# Patient Record
Sex: FEMALE | Race: WHITE | Employment: FULL TIME | URBAN - METROPOLITAN AREA
[De-identification: names, ages, dates, MRNs, and addresses within clinical notes are randomized per-mention and may not be internally consistent; named-entity substitution may affect disease eponyms.]

---

## 2022-07-05 ENCOUNTER — TRANSFERRED RECORDS (OUTPATIENT)
Dept: HEALTH INFORMATION MANAGEMENT | Facility: CLINIC | Age: 37
End: 2022-07-05

## 2022-07-07 ENCOUNTER — OFFICE VISIT (OUTPATIENT)
Dept: SURGERY | Facility: CLINIC | Age: 37
End: 2022-07-07
Payer: COMMERCIAL

## 2022-07-07 ENCOUNTER — TELEPHONE (OUTPATIENT)
Dept: SURGERY | Facility: CLINIC | Age: 37
End: 2022-07-07

## 2022-07-07 VITALS
HEART RATE: 90 BPM | DIASTOLIC BLOOD PRESSURE: 74 MMHG | BODY MASS INDEX: 21.39 KG/M2 | SYSTOLIC BLOOD PRESSURE: 108 MMHG | OXYGEN SATURATION: 97 % | WEIGHT: 133.1 LBS | HEIGHT: 66 IN

## 2022-07-07 DIAGNOSIS — K80.20 SYMPTOMATIC CHOLELITHIASIS: Primary | ICD-10-CM

## 2022-07-07 PROCEDURE — 99203 OFFICE O/P NEW LOW 30 MIN: CPT | Performed by: SURGERY

## 2022-07-07 RX ORDER — ESTRADIOL AND NORETHINDRONE ACETATE 1; .5 MG/1; MG/1
1 TABLET ORAL EVERY MORNING
COMMUNITY

## 2022-07-07 RX ORDER — ONDANSETRON 4 MG/1
TABLET, FILM COATED ORAL EVERY 8 HOURS PRN
COMMUNITY

## 2022-07-07 RX ORDER — IBUPROFEN 400 MG/1
400 TABLET, FILM COATED ORAL EVERY 6 HOURS PRN
COMMUNITY

## 2022-07-07 RX ORDER — CLONAZEPAM 2 MG/1
0.5 TABLET ORAL 2 TIMES DAILY PRN
COMMUNITY

## 2022-07-07 RX ORDER — ACETAMINOPHEN 325 MG/1
325-650 TABLET ORAL EVERY 6 HOURS PRN
COMMUNITY

## 2022-07-07 ASSESSMENT — PAIN SCALES - GENERAL: PAINLEVEL: MILD PAIN (3)

## 2022-07-07 NOTE — NURSING NOTE
"Chief Complaint   Patient presents with     New Patient     Gallbladder issues       Vitals:    07/07/22 1212   BP: 108/74   BP Location: Left arm   Patient Position: Sitting   Cuff Size: Adult Regular   Pulse: 90   SpO2: 97%   Weight: 60.4 kg (133 lb 1.6 oz)   Height: 1.676 m (5' 6\")       Body mass index is 21.48 kg/m .                          Keren Hough, EMT    "

## 2022-07-07 NOTE — TELEPHONE ENCOUNTER
Patient called to schedule laparoscopic cholecystectomy with robotic assist, seen today in clinic with Dr. Haroldo Rust. Scheduled 7/14 at 12:25 p.m. at the [facility], to arrive at 11 a.m., 5th floor.  PAC scheduled for in-person visit tomorrow with Lucy Pyle.   Discussed patient will need to do an in-home COVID-19 test on 7/14 and bring the result with her when she comes for surgery.

## 2022-07-07 NOTE — PATIENT INSTRUCTIONS
You saw Dr Rust and were offered laparoscopic cholecystectomy with robot-assist. To schedule surgery, please call Jovanny Rincon at [phone]. She will help arrange a surgery date, as well as a pre-anesthesia clinic visit and a COVID test.

## 2022-07-07 NOTE — PROGRESS NOTES
New General Surgery Consultation Note        Liseth Gamez  [id number]  1985 July 7, 2022     Requesting Provider: Referred Self     Dear Dr Kay primary care provider on file.,     I had the pleasure of seeing your patient, Liseth Gamez is a 37 year old female who presents to clinic today for the following health issues       CHIEF COMPLAINT:  Epigastric pain    Assessment & Plan   Problem List Items Addressed This Visit    None     Visit Diagnoses     Symptomatic cholelithiasis    -  Primary    Relevant Orders    PAC Visit Referral (For [facility] Only)    Asymptomatic COVID-19 Virus (Coronavirus) by PCR    Case Request: CHOLECYSTECTOMY, LAPAROSCOPIC WITH ROBOTIC-ASSIST (Completed)       Patient will call Jovanny to schedule    30 minutes spent on the date of the encounter doing chart review, history and exam, documentation and further activities per the note      HISTORY OF PRESENT ILLNESS:  I had the pleasure of seeing Ms Gamez today. She is a 37F who rpesents ith worsening epigastric pain. The pin is now becoming more frequent and intense lasting ~2 hours and is qite painful. It is now associated with food such as corn dogs. She notes having cholelithiasis on ultrasound as well. No reflux.     ROS    PAST MEDICAL HISTORY:  No past medical history on file.     PAST SURGICAL HISTORY:  Deviated septum repair    MEDICATIONS:  Current Outpatient Medications   Medication     acetaminophen (TYLENOL) 325 MG tablet     clonazePAM (KLONOPIN) 2 MG tablet     estradiol-norethindrone (ACTIVELLA) 1-0.5 MG tablet     ibuprofen (ADVIL/MOTRIN) 400 MG tablet     ondansetron (ZOFRAN) 4 MG tablet     No current facility-administered medications for this visit.        ALLERGIES:  No Known Allergies     SOCIAL HISTORY:  Social History     Socioeconomic History     Marital status: Single     Spouse name: None     Number of children: None     Years of education: None     Highest education level: None   Tobacco Use     Smoking  "status: Never Smoker     Smokeless tobacco: Never Used           PHYSICAL EXAM:  Objective    /74 (BP Location: Left arm, Patient Position: Sitting, Cuff Size: Adult Regular)   Pulse 90   Ht 1.676 m (5' 6\")   Wt 60.4 kg (133 lb 1.6 oz)   SpO2 97%   BMI 21.48 kg/m    /74 (BP Location: Left arm, Patient Position: Sitting, Cuff Size: Adult Regular)   Pulse 90   Ht 1.676 m (5' 6\")   Wt 60.4 kg (133 lb 1.6 oz)   SpO2 97%   BMI 21.48 kg/m    Body mass index is 21.48 kg/m .  Physical Exam   aaox3  abd s/nt/nd       DISCUSSION OF RISKS:  I reviewed the risks of surgery with Liseth Gamez.    These include, but are not limited to, death, myocardial infarction, pneumonia, urinary tract infection, deep venous thrombosis with or without pulmonary embolus, abdominal infection from bowel injury or abscess, bowel obstruction, wound infection, and bleeding.    More specific risks related to laparoscopic cholecystectomy include bile duct injury (3/1000), bile leak (10/1000), retained common bile duct stone (10/1000), postcholecystectomy diarrhea (1-2%) and these complications may require additional treatment. Also discussed having to open or abort.      Sincerely,     Haroldo Rust MD  "

## 2022-07-07 NOTE — LETTER
Date:July 7, 2022      Provider requested that no letter be sent. Do not send.       [facility]

## 2022-07-07 NOTE — LETTER
7/7/2022       RE: Liseth Gamez  [address]  [place] [address]     Dear Colleague,    Thank you for referring your patient, Liseth Gamez, to the [facility] [place] [facility]. Please see a copy of my visit note below.    New General Surgery Consultation Note        Liseth Gamez  [id number]  1985 July 7, 2022     Requesting Provider: Referred Self     Dear Dr Kay primary care provider on file.,     I had the pleasure of seeing your patient, Liseth Gamez is a 37 year old female who presents to clinic today for the following health issues       CHIEF COMPLAINT:  Epigastric pain    Assessment & Plan   Problem List Items Addressed This Visit    None     Visit Diagnoses     Symptomatic cholelithiasis    -  Primary    Relevant Orders    PAC Visit Referral (For [facility] Only)    Asymptomatic COVID-19 Virus (Coronavirus) by PCR    Case Request: CHOLECYSTECTOMY, LAPAROSCOPIC WITH ROBOTIC-ASSIST (Completed)       Patient will call Jovanny to schedule    30 minutes spent on the date of the encounter doing chart review, history and exam, documentation and further activities per the note      HISTORY OF PRESENT ILLNESS:  I had the pleasure of seeing Ms Gamez today. She is a 37F who rpesents ith worsening epigastric pain. The pin is now becoming more frequent and intense lasting ~2 hours and is qite painful. It is now associated with food such as corn dogs. She notes having cholelithiasis on ultrasound as well. No reflux.     ROS    PAST MEDICAL HISTORY:  No past medical history on file.     PAST SURGICAL HISTORY:  Deviated septum repair    MEDICATIONS:  Current Outpatient Medications   Medication     acetaminophen (TYLENOL) 325 MG tablet     clonazePAM (KLONOPIN) 2 MG tablet     estradiol-norethindrone (ACTIVELLA) 1-0.5 MG tablet     ibuprofen (ADVIL/MOTRIN) 400 MG tablet     ondansetron (ZOFRAN) 4 MG tablet     No current  "facility-administered medications for this visit.        ALLERGIES:  No Known Allergies     SOCIAL HISTORY:  Social History     Socioeconomic History     Marital status: Single     Spouse name: None     Number of children: None     Years of education: None     Highest education level: None   Tobacco Use     Smoking status: Never Smoker     Smokeless tobacco: Never Used           PHYSICAL EXAM:  Objective    /74 (BP Location: Left arm, Patient Position: Sitting, Cuff Size: Adult Regular)   Pulse 90   Ht 1.676 m (5' 6\")   Wt 60.4 kg (133 lb 1.6 oz)   SpO2 97%   BMI 21.48 kg/m    /74 (BP Location: Left arm, Patient Position: Sitting, Cuff Size: Adult Regular)   Pulse 90   Ht 1.676 m (5' 6\")   Wt 60.4 kg (133 lb 1.6 oz)   SpO2 97%   BMI 21.48 kg/m    Body mass index is 21.48 kg/m .  Physical Exam   aaox3  abd s/nt/nd       DISCUSSION OF RISKS:  I reviewed the risks of surgery with Liseth Gamez.    These include, but are not limited to, death, myocardial infarction, pneumonia, urinary tract infection, deep venous thrombosis with or without pulmonary embolus, abdominal infection from bowel injury or abscess, bowel obstruction, wound infection, and bleeding.    More specific risks related to laparoscopic cholecystectomy include bile duct injury (3/1000), bile leak (10/1000), retained common bile duct stone (10/1000), postcholecystectomy diarrhea (1-2%) and these complications may require additional treatment. Also discussed having to open or abort.      Sincerely,     Haroldo Rust MD      Again, thank you for allowing me to participate in the care of your patient.      Sincerely,    Haroldo Rust MD      "

## 2022-07-08 ENCOUNTER — OFFICE VISIT (OUTPATIENT)
Dept: SURGERY | Facility: CLINIC | Age: 37
End: 2022-07-08
Payer: COMMERCIAL

## 2022-07-08 ENCOUNTER — ANESTHESIA EVENT (OUTPATIENT)
Dept: SURGERY | Facility: AMBULATORY SURGERY CENTER | Age: 37
End: 2022-07-08
Payer: COMMERCIAL

## 2022-07-08 ENCOUNTER — PRE VISIT (OUTPATIENT)
Dept: SURGERY | Facility: CLINIC | Age: 37
End: 2022-07-08

## 2022-07-08 VITALS
SYSTOLIC BLOOD PRESSURE: 120 MMHG | TEMPERATURE: 98.2 F | RESPIRATION RATE: 16 BRPM | DIASTOLIC BLOOD PRESSURE: 79 MMHG | HEART RATE: 78 BPM | WEIGHT: 132.1 LBS | OXYGEN SATURATION: 97 % | HEIGHT: 66 IN | BODY MASS INDEX: 21.23 KG/M2

## 2022-07-08 DIAGNOSIS — K80.20 SYMPTOMATIC CHOLELITHIASIS: ICD-10-CM

## 2022-07-08 DIAGNOSIS — Z01.818 PRE-OP EXAMINATION: Primary | ICD-10-CM

## 2022-07-08 PROCEDURE — 99203 OFFICE O/P NEW LOW 30 MIN: CPT | Performed by: PHYSICIAN ASSISTANT

## 2022-07-08 ASSESSMENT — LIFESTYLE VARIABLES: TOBACCO_USE: 0

## 2022-07-08 ASSESSMENT — PAIN SCALES - GENERAL: PAINLEVEL: MILD PAIN (2)

## 2022-07-08 NOTE — TELEPHONE ENCOUNTER
FUTURE VISIT INFORMATION      SURGERY INFORMATION:    Date: 7/14/22    [facility]    Surgeon:  Haroldo Rust MD    Anesthesia Type:  general    Procedure: CHOLECYSTECTOMY, LAPAROSCOPIC WITH ROBOTIC-ASSIST    Consult: ov 7/7/22    RECORDS REQUESTED FROM:        Pertinent Medical History: None

## 2022-07-08 NOTE — H&P
Pre-Operative H & P     CC:  Preoperative exam to assess for increased cardiopulmonary risk while undergoing surgery and anesthesia.    Date of Encounter: 7/8/2022  Primary Care Physician:  No primary care provider on file.     Reason for visit:   Encounter Diagnoses   Name Primary?     Pre-op examination Yes     Symptomatic cholelithiasis        HPI  Liseth Gamez is a 37 year old female who presents for pre-operative H & P in preparation for  Procedure Information     Case: [id number] Date/Time: 07/14/22 1205    Procedure: CHOLECYSTECTOMY, LAPAROSCOPIC WITH ROBOTIC-ASSIST (N/A Abdomen)    Anesthesia type: General    Diagnosis: Symptomatic cholelithiasis [K80.20]    Pre-op diagnosis: Symptomatic cholelithiasis [K80.20]    [facility] [place] [address] [facility]    Providers: Haroldo Rust MD          The patient is a 37-year-old woman with a medical history significant for hashimoto's thyroiditis, recent kidney and bladder infection and anxiety who presented to Dr. Rust's clinic on 7/7/2022 with complaints of worsening epigastric pain.  She had prior ultrasound which showed cholelithiasis.  She was counseled for the procedure as above.    History is obtained from the patient and chart review    Hx of abnormal bleeding or anti-platelet use: none    Menstrual history: Patient's last menstrual period was 06/24/2022 (within days).:      Past Medical History  Past Medical History:   Diagnosis Date     Anxiety      Hashimoto's thyroiditis      Symptomatic cholelithiasis        Past Surgical History  Past Surgical History:   Procedure Laterality Date     EGD       RECONSTRUCTION NASAL VALVE, WITH SUBMUCOSAL RESECTION OF TURBINATES AND SEPTOPLASTY         Prior to Admission Medications  Current Outpatient Medications   Medication Sig Dispense Refill     acetaminophen (TYLENOL) 325 MG tablet Take 325-650 mg by mouth every 6 hours as needed for mild pain       clonazePAM  (KLONOPIN) 2 MG tablet Take 0.5 mg by mouth 2 times daily as needed for anxiety       estradiol-norethindrone (ACTIVELLA) 1-0.5 MG tablet Take 1 tablet by mouth every morning       ibuprofen (ADVIL/MOTRIN) 400 MG tablet Take 400 mg by mouth every 6 hours as needed for moderate pain       ondansetron (ZOFRAN) 4 MG tablet Take by mouth every 8 hours as needed for nausea         Allergies  No Known Allergies    Social History  Social History     Socioeconomic History     Marital status: Single     Spouse name: Not on file     Number of children: Not on file     Years of education: Not on file     Highest education level: Not on file   Occupational History     Not on file   Tobacco Use     Smoking status: Never Smoker     Smokeless tobacco: Never Used   Substance and Sexual Activity     Alcohol use: Not on file     Drug use: Not on file     Sexual activity: Not on file   Other Topics Concern     Not on file   Social History Narrative     Not on file     Social Determinants of Health     Financial Resource Strain: Not on file   Food Insecurity: Not on file   Transportation Needs: Not on file   Physical Activity: Not on file   Stress: Not on file   Social Connections: Not on file   Intimate Partner Violence: Not on file   Housing Stability: Not on file       Family History  Family History   Problem Relation Age of Onset     Anesthesia Reaction No family hx of        Review of Systems  The complete review of systems is negative other than noted in the HPI or here.   Anesthesia Evaluation   Pt has had prior anesthetic. Type: MAC and General.        ROS/MED HX  ENT/Pulmonary: Comment: S/p septoplasty and turbinate reduction   (-) tobacco use   Neurologic:  - neg neurologic ROS     Cardiovascular:       METS/Exercise Tolerance: >4 METS    Hematologic:  - neg hematologic  ROS     Musculoskeletal:  - neg musculoskeletal ROS     GI/Hepatic:     (+) cholecystitis/cholelithiasis,  (-) GERD   Renal/Genitourinary:  - neg Renal ROS    "  Endo:     (+) thyroid problem,  Hashimoto's thyroiditis - not on medications,     Psychiatric/Substance Use:     (+) psychiatric history anxiety     Infectious Disease:  - neg infectious disease ROS     Malignancy:  - neg malignancy ROS     Other:  - neg other ROS          /79 (BP Location: Right arm, Patient Position: Sitting, Cuff Size: Adult Regular)   Pulse 78   Temp 98.2  F (36.8  C) (Oral)   Resp 16   Ht 1.676 m (5' 6\")   Wt 59.9 kg (132 lb 1.6 oz)   LMP 06/24/2022 (Within Days)   SpO2 97%   Breastfeeding No   BMI 21.32 kg/m      Physical Exam   Constitutional: Awake, alert, cooperative, no apparent distress, and appears stated age.  Eyes: Pupils equal, round and reactive to light, extra ocular muscles intact, sclera clear, conjunctiva normal.  HENT: Normocephalic, oral pharynx with moist mucus membranes, good dentition. No goiter appreciated.   Respiratory: Clear to auscultation bilaterally, no crackles or wheezing.  Cardiovascular: Regular rate and rhythm, normal S1 and S2, and no murmur noted.  Carotids +2, no bruits. No edema. Palpable pulses to radial  DP and PT arteries.   GI: Normal bowel sounds, soft, non-distended, non-tender, no masses palpated, no hepatosplenomegaly.  No peritoneal signs  Lymph/Hematologic: No cervical lymphadenopathy and no supraclavicular lymphadenopathy.  Genitourinary:  defer  Skin: Warm and dry.  No rashes at anticipated surgical site.   Musculoskeletal: Full ROM of neck. There is no redness, warmth, or swelling of the joints. Gross motor strength is normal.    Neurologic: Awake, alert, oriented to name, place and time. Cranial nerves II-XII are grossly intact. Gait is normal.   Neuropsychiatric: Calm, cooperative. Normal affect.     Prior Labs/Diagnostic Studies   All labs and imaging personally reviewed    Contains abnormal data COMP METABOLIC PANEL  Specimen:  Blood - Blood specimen (specimen)   Ref Range & Units 8 d ago Comments   SODIUM 135 - 145 mmol/L " 139     POTASSIUM 3.5 - 5.0 mmol/L 3.8     CHLORIDE 98 - 110 mmol/L 105     CO2,TOTAL 21 - 31 mmol/L 25     ANION GAP 5 - 18 9     GLUCOSE 65 - 100 mg/dL 89     CALCIUM 8.5 - 10.5 mg/dL 9.3     BUN 8 - 25 mg/dL 12     CREATININE 0.57 - 1.11 mg/dL 0.81     BUN/CREAT RATIO           10 - 20 15     ALBUMIN 3.5 - 5.2 g/dL 4.4     PROTEIN,TOTAL 6.0 - 8.0 g/dL 7.5     GLOBULIN                  2.0 - 3.7 g/dL 3.1     A/G RATIO 1.0 - 2.0 1.4     BILIRUBIN,TOTAL 0.2 - 1.2 mg/dL 0.3     ALK PHOSPHATASE 50 - 136 IU/L 44 Low      ALT (SGPT) 8 - 45 IU/L 50 High      AST (SGOT) 2 - 40 IU/L 22     eGFR >90 mL/min/1.73m2 >90  As of 03/15/2022, eGFR is calculated by the CKD-EPI creatinine equation without race adjustment.  eGFR can be influenced by muscle mass, exercise, and diet.  The reported eGFR is an estimation only and is only applicable if the renal function is stable.   Resulting Agency  [facility] LABORATORY    Specimen Collected: 06/30/22 11:45 AM Last Resulted: 06/30/22 12:25 PM   CBC WITH AUTO DIFFERENTIAL  Specimen:  Blood - Blood specimen (specimen)   Ref Range & Units 8 d ago   WHITE BLOOD COUNT         4.5 - 11.0 thou/cu mm 6.2    RED BLOOD COUNT           4.00 - 5.20 mil/cu mm 4.20    HEMOGLOBIN                12.0 - 16.0 g/dL 12.4    HEMATOCRIT                33.0 - 51.0 % 38.6    MCV                       80 - 100 fL 92    MCH                       26.0 - 34.0 pg 29.5    MCHC                      32.0 - 36.0 g/dL 32.1    RDW                       11.5 - 15.5 % 13.3    PLATELET COUNT            140 - 440 thou/cu mm 293    MPV                       6.5 - 11.0 fL 10.0    NEUTROPHILS               % 60.1    LYMPHOCYTES               % 32.0    MONOCYTES                 % 5.0    EOSINOPHILS               % 1.8    BASOPHILS                 % 0.8    IMMATURE GRANULOCYTES(METAS,MYELOS,PROS) % 0.3    ABSOLUTE NEUTROPHILS      1.7 - 7.0 thou/cu mm 3.7    ABSOLUTE LYMPHOCYTES      0.9 - 2.9 thou/cu mm 2.0    ABSOLUTE  MONOCYTES        <0.9 thou/cu mm 0.3    ABSOLUTE EOSINOPHILS      <0.5 thou/cu mm 0.1    ABSOLUTE BASOPHILS        <0.3 thou/cu mm 0.1    ABSOLUTE IMMATURE GRANULOCYTES(METAS,MYELOS,PROS) <0.3 thou/cu mm 0.0    Resulting Agency  [facility] LABORATORY   Specimen Collected: 06/30/22 11:45 AM Last Resulted: 06/30/22 12:02 PM         EKG/ stress test - if available please see in ROS above       The patient's records and results personally reviewed by this provider.     Outside records reviewed from: Care Everywhere          Assessment      Liseth Gamez is a 37 year old female seen as a PAC referral for risk assessment and optimization for anesthesia.    Plan/Recommendations  Pt will be optimized for the proposed procedure.  See below for details on the assessment, risk, and preoperative recommendations    NEUROLOGY  - No history of TIA, CVA or seizure  -Post Op delirium risk factors:  No risk identified    ENT  - No current airway concerns.  Will need to be reassessed day of surgery.  Mallampati: I  TM: > 3    CARDIAC  - No history of CAD, Hypertension and Afib  - METS (Metabolic Equivalents)  Patient performs 4 or more METS exercise without symptoms            Total Score: 0      RCRI-Very low risk: Class 1 0.4% complication rate            Total Score: 0        PULMONARY  - Obstructive Sleep Apnea  No current risk of obstructive sleep apnea   KAYA Low Risk            Total Score: 0      - Denies asthma or inhaler use  - Tobacco History      History   Smoking Status     Never Smoker   Smokeless Tobacco     Never Used       GI  ~ Symptomatic cholecystitis - her symptoms are epigastric in nature but no GERD symptoms. She reports she had recent EGD which was normal.   ~ the patient does report RLQ pain. No peritoneal signs for appendicitis. She reports she had pelvic US and no ovarian cysts seen on images. She does report the pain is related to using abdominal muscles from laying to sitting  PONV High Risk  Total  "Score: 3           1 AN PONV: Pt is Female    1 AN PONV: Patient is not a current smoker    1 AN PONV: Intended Post Op Opioids        /RENAL  - On birth control  ~ The patient was recently treated for UTI and completed antibiotics 5 days ago.     ENDOCRINE    - BMI: Estimated body mass index is 21.32 kg/m  as calculated from the following:    Height as of this encounter: 1.676 m (5' 6\").    Weight as of this encounter: 59.9 kg (132 lb 1.6 oz).  Healthy Weight (BMI 18.5-24.9)  - No history of Diabetes Mellitus    HEME  VTE Low Risk 0.26%            Total Score: 0      - No history of abnormal bleeding or antiplatelet use.      PSYCH  - anxiety - continue PRN clonazepam       The patient is optimized for their procedure. AVS with information on surgery time/arrival time, meds and NPO status given by nursing staff. No further diagnostic testing indicated.      On the day of service:     Prep time: 13 minutes  Visit time: 17 minutes  Documentation time: 7 minutes  ------------------------------------------  Total time: 37 minutes      Lucy Pyle PA-C  Preoperative Assessment Center  [facility]  Clinic and Surgery Center  Phone: [phone]  Fax: [phone]  "

## 2022-07-08 NOTE — PATIENT INSTRUCTIONS
Preparing for Your Surgery      Name:  Liseth Gamez   MRN:  [id number]   :  1985   Today's Date:  2022         Arriving for surgery:  Surgery date:  2022  Arrival time:  11:00 am    Restrictions due to COVID 19:    Effective 2022  1 visitor may accompany patient and wait in the surgery waiting room  All visitors must wear a mask and social distance      parking is available for anyone with mobility limitations or disabilities. (Monday- Friday 7 am- 5 pm)    Please come to:    [facility]  [address] [place] [id number]    Please check in on the 5th floor at the Ambulatory Surgery Center       What can I eat or drink?    -  You may eat and drink normally until 8 hours before surgery. (Until 4 am)  -  You may have clear liquids up to 4 hours before surgery. (Until 8 am)  Examples of clear liquids:  Water  Clear broth  Juices (apple, white grape, white cranberry  and cider) without pulp  Noncarbonated, powder based beverages  (lemonade and Jose L-Aid)  Sodas (Sprite, 7-Up, ginger ale and seltzer)  Coffee or tea (without milk or cream)  Gatorade    --No alcohol for at least 24 hours before surgery    Which medicines can I take?    Hold Aspirin for 7 days before surgery.   Hold Multivitamins for 7 days before surgery.  Hold Supplements for 7 days before surgery.  Hold Ibuprofen (Advil, Motrin) for 1 day before surgery--unless otherwise directed by surgeon.  Hold Naproxen (Aleve) for 4 days before surgery.          -  PLEASE TAKE the following medications the day of surgery   Tylenol if needed  Clonazepam if needed  Estradiol (Activelle)  Ondansetron if needed       How do I prepare myself?  - Please take 2 showers before surgery using Scrubcare or Hibiclens soap.    Use this soap only from the neck to your toes.     Leave the soap on your skin for one minute--then rinse thoroughly.      You may use your own shampoo and conditioner; no other hair products.    - Please remove all jewelry and body piercings.  - No lotions, deodorants or fragrance.  - No makeup or fingernail polish.   - Bring your ID and insurance card.    -If you have a Deep Brain Stimulator, a Spinal Cord Stimulator or any implanted Neuro Device you must bring the remote to the Surgery Center         ALL PATIENTS ARE REQUIRED TO HAVE A RESPONSIBLE ADULT TO DRIVE AND BE IN ATTENDANCE WITH THEM FOR 24 HOURS FOLLOWING SURGERY       Questions or Concerns:    -For questions regarding the day of surgery please contact the Ambulatory Surgery Center at [phone].    -If you have health changes between today and your surgery please contact your surgeon.     - For questions after surgery please contact your surgeon's office     For questions after surgery contact your surgeon

## 2022-07-13 RX ORDER — FENTANYL CITRATE 50 UG/ML
25 INJECTION, SOLUTION INTRAMUSCULAR; INTRAVENOUS
Status: CANCELLED | OUTPATIENT
Start: 2022-07-13

## 2022-07-14 ENCOUNTER — HOSPITAL ENCOUNTER (OUTPATIENT)
Facility: AMBULATORY SURGERY CENTER | Age: 37
Discharge: HOME | End: 2022-07-14
Attending: SURGERY
Payer: COMMERCIAL

## 2022-07-14 ENCOUNTER — ANESTHESIA (OUTPATIENT)
Dept: SURGERY | Facility: AMBULATORY SURGERY CENTER | Age: 37
End: 2022-07-14
Payer: COMMERCIAL

## 2022-07-14 VITALS
BODY MASS INDEX: 21.21 KG/M2 | TEMPERATURE: 98 F | DIASTOLIC BLOOD PRESSURE: 67 MMHG | HEART RATE: 95 BPM | SYSTOLIC BLOOD PRESSURE: 111 MMHG | OXYGEN SATURATION: 96 % | WEIGHT: 132 LBS | HEIGHT: 66 IN | RESPIRATION RATE: 16 BRPM

## 2022-07-14 DIAGNOSIS — K80.20 SYMPTOMATIC CHOLELITHIASIS: ICD-10-CM

## 2022-07-14 LAB
HCG UR QL: NEGATIVE
INTERNAL QC OK POCT: NORMAL
POCT KIT EXPIRATION DATE: NORMAL
POCT KIT LOT NUMBER: NORMAL

## 2022-07-14 PROCEDURE — 47562 LAPAROSCOPIC CHOLECYSTECTOMY: CPT

## 2022-07-14 PROCEDURE — 88304 TISSUE EXAM BY PATHOLOGIST: CPT | Mod: TC | Performed by: SURGERY

## 2022-07-14 PROCEDURE — 47562 LAPAROSCOPIC CHOLECYSTECTOMY: CPT | Performed by: SURGERY

## 2022-07-14 PROCEDURE — 81025 URINE PREGNANCY TEST: CPT | Performed by: PATHOLOGY

## 2022-07-14 PROCEDURE — 88304 TISSUE EXAM BY PATHOLOGIST: CPT | Mod: 26 | Performed by: PATHOLOGY

## 2022-07-14 RX ORDER — LABETALOL HYDROCHLORIDE 5 MG/ML
10 INJECTION, SOLUTION INTRAVENOUS
Status: DISCONTINUED | OUTPATIENT
Start: 2022-07-14 | End: 2022-07-14 | Stop reason: HOSPADM

## 2022-07-14 RX ORDER — OXYCODONE HYDROCHLORIDE 5 MG/1
5 TABLET ORAL EVERY 4 HOURS PRN
Status: DISCONTINUED | OUTPATIENT
Start: 2022-07-14 | End: 2022-07-15 | Stop reason: HOSPADM

## 2022-07-14 RX ORDER — LIOTHYRONINE SODIUM 5 UG/1
TABLET ORAL
COMMUNITY
Start: 2022-07-06

## 2022-07-14 RX ORDER — INDOCYANINE GREEN AND WATER 25 MG
2.5 KIT INJECTION ONCE
Status: COMPLETED | OUTPATIENT
Start: 2022-07-14 | End: 2022-07-14

## 2022-07-14 RX ORDER — LIDOCAINE 40 MG/G
CREAM TOPICAL
Status: DISCONTINUED | OUTPATIENT
Start: 2022-07-14 | End: 2022-07-14 | Stop reason: HOSPADM

## 2022-07-14 RX ORDER — FENTANYL CITRATE 50 UG/ML
25-50 INJECTION, SOLUTION INTRAMUSCULAR; INTRAVENOUS
Status: DISCONTINUED | OUTPATIENT
Start: 2022-07-14 | End: 2022-07-14 | Stop reason: HOSPADM

## 2022-07-14 RX ORDER — KETOROLAC TROMETHAMINE 30 MG/ML
INJECTION, SOLUTION INTRAMUSCULAR; INTRAVENOUS PRN
Status: DISCONTINUED | OUTPATIENT
Start: 2022-07-14 | End: 2022-07-14

## 2022-07-14 RX ORDER — PROPOFOL 10 MG/ML
INJECTION, EMULSION INTRAVENOUS CONTINUOUS PRN
Status: DISCONTINUED | OUTPATIENT
Start: 2022-07-14 | End: 2022-07-14

## 2022-07-14 RX ORDER — BUPIVACAINE HYDROCHLORIDE 2.5 MG/ML
INJECTION, SOLUTION INFILTRATION; PERINEURAL PRN
Status: DISCONTINUED | OUTPATIENT
Start: 2022-07-14 | End: 2022-07-14 | Stop reason: HOSPADM

## 2022-07-14 RX ORDER — CEFAZOLIN SODIUM 2 G/50ML
2 SOLUTION INTRAVENOUS
Status: COMPLETED | OUTPATIENT
Start: 2022-07-14 | End: 2022-07-14

## 2022-07-14 RX ORDER — CEFAZOLIN SODIUM 2 G/50ML
2 SOLUTION INTRAVENOUS SEE ADMIN INSTRUCTIONS
Status: DISCONTINUED | OUTPATIENT
Start: 2022-07-14 | End: 2022-07-14 | Stop reason: HOSPADM

## 2022-07-14 RX ORDER — MEPERIDINE HYDROCHLORIDE 25 MG/ML
12.5 INJECTION INTRAMUSCULAR; INTRAVENOUS; SUBCUTANEOUS
Status: DISCONTINUED | OUTPATIENT
Start: 2022-07-14 | End: 2022-07-15 | Stop reason: HOSPADM

## 2022-07-14 RX ORDER — PROPOFOL 10 MG/ML
INJECTION, EMULSION INTRAVENOUS PRN
Status: DISCONTINUED | OUTPATIENT
Start: 2022-07-14 | End: 2022-07-14

## 2022-07-14 RX ORDER — HYDRALAZINE HYDROCHLORIDE 20 MG/ML
2.5-5 INJECTION INTRAMUSCULAR; INTRAVENOUS EVERY 10 MIN PRN
Status: DISCONTINUED | OUTPATIENT
Start: 2022-07-14 | End: 2022-07-14 | Stop reason: HOSPADM

## 2022-07-14 RX ORDER — ALBUTEROL SULFATE 0.83 MG/ML
2.5 SOLUTION RESPIRATORY (INHALATION) EVERY 4 HOURS PRN
Status: DISCONTINUED | OUTPATIENT
Start: 2022-07-14 | End: 2022-07-14 | Stop reason: HOSPADM

## 2022-07-14 RX ORDER — ONDANSETRON 4 MG/1
4 TABLET, ORALLY DISINTEGRATING ORAL EVERY 8 HOURS PRN
Qty: 8 TABLET | Refills: 0 | Status: SHIPPED | OUTPATIENT
Start: 2022-07-14 | End: 2022-07-14

## 2022-07-14 RX ORDER — NALOXONE HYDROCHLORIDE 0.4 MG/ML
0.2 INJECTION, SOLUTION INTRAMUSCULAR; INTRAVENOUS; SUBCUTANEOUS
Status: DISCONTINUED | OUTPATIENT
Start: 2022-07-14 | End: 2022-07-14 | Stop reason: HOSPADM

## 2022-07-14 RX ORDER — SODIUM CHLORIDE, SODIUM LACTATE, POTASSIUM CHLORIDE, CALCIUM CHLORIDE 600; 310; 30; 20 MG/100ML; MG/100ML; MG/100ML; MG/100ML
INJECTION, SOLUTION INTRAVENOUS CONTINUOUS
Status: DISCONTINUED | OUTPATIENT
Start: 2022-07-14 | End: 2022-07-14 | Stop reason: HOSPADM

## 2022-07-14 RX ORDER — GABAPENTIN 300 MG/1
300 CAPSULE ORAL
Status: COMPLETED | OUTPATIENT
Start: 2022-07-14 | End: 2022-07-14

## 2022-07-14 RX ORDER — SCOLOPAMINE TRANSDERMAL SYSTEM 1 MG/1
1 PATCH, EXTENDED RELEASE TRANSDERMAL
Status: DISCONTINUED | OUTPATIENT
Start: 2022-07-14 | End: 2022-07-15 | Stop reason: HOSPADM

## 2022-07-14 RX ORDER — ONDANSETRON 2 MG/ML
4 INJECTION INTRAMUSCULAR; INTRAVENOUS EVERY 30 MIN PRN
Status: DISCONTINUED | OUTPATIENT
Start: 2022-07-14 | End: 2022-07-15 | Stop reason: HOSPADM

## 2022-07-14 RX ORDER — DEXAMETHASONE SODIUM PHOSPHATE 4 MG/ML
INJECTION, SOLUTION INTRA-ARTICULAR; INTRALESIONAL; INTRAMUSCULAR; INTRAVENOUS; SOFT TISSUE PRN
Status: DISCONTINUED | OUTPATIENT
Start: 2022-07-14 | End: 2022-07-14

## 2022-07-14 RX ORDER — PROPRANOLOL HYDROCHLORIDE 10 MG/1
10 TABLET ORAL
COMMUNITY
Start: 2022-07-13

## 2022-07-14 RX ORDER — LIDOCAINE HYDROCHLORIDE 20 MG/ML
INJECTION, SOLUTION INFILTRATION; PERINEURAL PRN
Status: DISCONTINUED | OUTPATIENT
Start: 2022-07-14 | End: 2022-07-14

## 2022-07-14 RX ORDER — NALOXONE HYDROCHLORIDE 0.4 MG/ML
0.4 INJECTION, SOLUTION INTRAMUSCULAR; INTRAVENOUS; SUBCUTANEOUS
Status: DISCONTINUED | OUTPATIENT
Start: 2022-07-14 | End: 2022-07-14 | Stop reason: HOSPADM

## 2022-07-14 RX ORDER — ACETAMINOPHEN 325 MG/1
975 TABLET ORAL
Status: DISCONTINUED | OUTPATIENT
Start: 2022-07-14 | End: 2022-07-15 | Stop reason: HOSPADM

## 2022-07-14 RX ORDER — OXYCODONE HYDROCHLORIDE 5 MG/1
5-10 TABLET ORAL EVERY 4 HOURS PRN
Qty: 20 TABLET | Refills: 0 | Status: SHIPPED | OUTPATIENT
Start: 2022-07-14

## 2022-07-14 RX ORDER — FLUMAZENIL 0.1 MG/ML
0.2 INJECTION, SOLUTION INTRAVENOUS
Status: DISCONTINUED | OUTPATIENT
Start: 2022-07-14 | End: 2022-07-14 | Stop reason: HOSPADM

## 2022-07-14 RX ORDER — ACETAMINOPHEN 325 MG/1
975 TABLET ORAL ONCE
Status: COMPLETED | OUTPATIENT
Start: 2022-07-14 | End: 2022-07-14

## 2022-07-14 RX ORDER — FENTANYL CITRATE 50 UG/ML
INJECTION, SOLUTION INTRAMUSCULAR; INTRAVENOUS PRN
Status: DISCONTINUED | OUTPATIENT
Start: 2022-07-14 | End: 2022-07-14

## 2022-07-14 RX ORDER — ZOLPIDEM TARTRATE 5 MG/1
5 TABLET ORAL
COMMUNITY
Start: 2022-07-13

## 2022-07-14 RX ORDER — HYDROMORPHONE HYDROCHLORIDE 1 MG/ML
.2-.4 INJECTION, SOLUTION INTRAMUSCULAR; INTRAVENOUS; SUBCUTANEOUS EVERY 5 MIN PRN
Status: DISCONTINUED | OUTPATIENT
Start: 2022-07-14 | End: 2022-07-14 | Stop reason: HOSPADM

## 2022-07-14 RX ORDER — ONDANSETRON 4 MG/1
4 TABLET, ORALLY DISINTEGRATING ORAL EVERY 30 MIN PRN
Status: DISCONTINUED | OUTPATIENT
Start: 2022-07-14 | End: 2022-07-15 | Stop reason: HOSPADM

## 2022-07-14 RX ORDER — FENTANYL CITRATE 50 UG/ML
25-50 INJECTION, SOLUTION INTRAMUSCULAR; INTRAVENOUS EVERY 5 MIN PRN
Status: DISCONTINUED | OUTPATIENT
Start: 2022-07-14 | End: 2022-07-14 | Stop reason: HOSPADM

## 2022-07-14 RX ORDER — ONDANSETRON 2 MG/ML
INJECTION INTRAMUSCULAR; INTRAVENOUS PRN
Status: DISCONTINUED | OUTPATIENT
Start: 2022-07-14 | End: 2022-07-14

## 2022-07-14 RX ORDER — SCOLOPAMINE TRANSDERMAL SYSTEM 1 MG/1
1 PATCH, EXTENDED RELEASE TRANSDERMAL ONCE
Status: CANCELLED | OUTPATIENT
Start: 2022-07-14 | End: 2022-07-14

## 2022-07-14 RX ORDER — KETOROLAC TROMETHAMINE 30 MG/ML
15 INJECTION, SOLUTION INTRAMUSCULAR; INTRAVENOUS EVERY 6 HOURS PRN
Status: DISCONTINUED | OUTPATIENT
Start: 2022-07-14 | End: 2022-07-15 | Stop reason: HOSPADM

## 2022-07-14 RX ORDER — SODIUM CHLORIDE, SODIUM LACTATE, POTASSIUM CHLORIDE, CALCIUM CHLORIDE 600; 310; 30; 20 MG/100ML; MG/100ML; MG/100ML; MG/100ML
INJECTION, SOLUTION INTRAVENOUS CONTINUOUS
Status: DISCONTINUED | OUTPATIENT
Start: 2022-07-14 | End: 2022-07-15 | Stop reason: HOSPADM

## 2022-07-14 RX ADMIN — ACETAMINOPHEN 975 MG: 325 TABLET ORAL at 11:23

## 2022-07-14 RX ADMIN — ONDANSETRON 4 MG: 2 INJECTION INTRAMUSCULAR; INTRAVENOUS at 12:16

## 2022-07-14 RX ADMIN — LIDOCAINE HYDROCHLORIDE 100 MG: 20 INJECTION, SOLUTION INFILTRATION; PERINEURAL at 12:05

## 2022-07-14 RX ADMIN — SCOLOPAMINE TRANSDERMAL SYSTEM 1 PATCH: 1 PATCH, EXTENDED RELEASE TRANSDERMAL at 11:53

## 2022-07-14 RX ADMIN — CEFAZOLIN SODIUM 2 G: 2 SOLUTION INTRAVENOUS at 11:59

## 2022-07-14 RX ADMIN — Medication 0.5 MG: at 12:21

## 2022-07-14 RX ADMIN — DEXAMETHASONE SODIUM PHOSPHATE 4 MG: 4 INJECTION, SOLUTION INTRA-ARTICULAR; INTRALESIONAL; INTRAMUSCULAR; INTRAVENOUS; SOFT TISSUE at 12:16

## 2022-07-14 RX ADMIN — KETOROLAC TROMETHAMINE 30 MG: 30 INJECTION, SOLUTION INTRAMUSCULAR; INTRAVENOUS at 13:03

## 2022-07-14 RX ADMIN — SODIUM CHLORIDE, SODIUM LACTATE, POTASSIUM CHLORIDE, CALCIUM CHLORIDE: 600; 310; 30; 20 INJECTION, SOLUTION INTRAVENOUS at 11:33

## 2022-07-14 RX ADMIN — Medication 50 MG: at 12:06

## 2022-07-14 RX ADMIN — PROPOFOL 150 MCG/KG/MIN: 10 INJECTION, EMULSION INTRAVENOUS at 12:05

## 2022-07-14 RX ADMIN — PROPOFOL 200 MG: 10 INJECTION, EMULSION INTRAVENOUS at 12:05

## 2022-07-14 RX ADMIN — Medication 100 MCG: at 12:34

## 2022-07-14 RX ADMIN — OXYCODONE HYDROCHLORIDE 5 MG: 5 TABLET ORAL at 13:50

## 2022-07-14 RX ADMIN — GABAPENTIN 300 MG: 300 CAPSULE ORAL at 11:22

## 2022-07-14 RX ADMIN — Medication 0.5 MG: at 12:02

## 2022-07-14 RX ADMIN — INDOCYANINE GREEN AND WATER 2.5 MG: KIT at 11:34

## 2022-07-14 RX ADMIN — FENTANYL CITRATE 50 MCG: 50 INJECTION, SOLUTION INTRAMUSCULAR; INTRAVENOUS at 13:00

## 2022-07-14 NOTE — ANESTHESIA CARE TRANSFER NOTE
Patient: Liseth Gamez    Procedure: Procedure(s):  CHOLECYSTECTOMY, LAPAROSCOPIC WITH ROBOTIC-ASSIST       Diagnosis: Symptomatic cholelithiasis [K80.20]  Diagnosis Additional Information: No value filed.    Anesthesia Type:   General     Note:    Oropharynx: oropharynx clear of all foreign objects  Level of Consciousness: drowsy  Oxygen Supplementation: face mask  Level of Supplemental Oxygen (L/min / FiO2): 5  Independent Airway: airway patency satisfactory and stable  Dentition: dentition unchanged  Vital Signs Stable: post-procedure vital signs reviewed and stable  Report to RN Given: handoff report given  Patient transferred to: PACU  Comments: Resps easy and regular. Report to PACU RN  Handoff Report: Identifed the Patient, Identified the Reponsible Provider, Reviewed the pertinent medical history, Discussed the surgical course, Reviewed Intra-OP anesthesia mangement and issues during anesthesia, Set expectations for post-procedure period and Allowed opportunity for questions and acknowledgement of understanding      Vitals:  Vitals Value Taken Time   /72 07/14/22 1320   Temp 36.4  C (97.6  F) 07/14/22 1320   Pulse 82    Resp 20 07/14/22 1320   SpO2 99 % 07/14/22 1320       Electronically Signed By: BAUDILIO SÁNCHEZ CRNA  July 14, 2022  1:23 PM

## 2022-07-14 NOTE — PROGRESS NOTES
I reviewed the risks of surgery with Liseth Gamez.    These include, but are not limited to, death, myocardial infarction, pneumonia, urinary tract infection, deep venous thrombosis with or without pulmonary embolus, abdominal infection from bowel injury or abscess, bowel obstruction, wound infection, and bleeding.    More specific risks related to laparoscopic cholecystectomy include bile duct injury (3/1000), bile leak (10/1000), retained common bile duct stone (10/1000), postcholecystectomy diarrhea (1-2%) and these complications may require additional treatment.      Also discussed risks of aborting or opening if needed. Risks of anesthesia discussed as well.    Patient agrees to proceed with procedure as scheduled.    Haroldo Rust MD

## 2022-07-14 NOTE — OP NOTE
[facility]    Operative Note    Pre-operative diagnosis: Symptomatic cholelithiasis [K80.20];   Post-operative diagnosis same   Procedure: Procedure(s):  CHOLECYSTECTOMY, LAPAROSCOPIC WITH ROBOTIC-ASSIST   Surgeon: Surgeon(s) and Role:     * Haroldo Rust MD - Primary   Anesthesia: General    Estimated blood loss: 5 ml   Drains: None   Specimens: ID Type Source Tests Collected by Time Destination   1 : Gallbladder Tissue Gallbladder SURGICAL PATHOLOGY EXAM Haroldo Rust MD 7/14/2022 12:56 PM       Findings: evidence of mild chronic cholecystitis with adhesions.  ]   Complications: None.   Implants: None.       OPERATIVE INDICATIONS:  Liseth Gamez is a 37 year old female with a history of RUQabdominal pain associated with cholelithiasis on right upper quadrant ultrasound.      After understanding the risks and benefits of proceeding with surgery, the patient has an indication for laparoscopic cholecystectomy with robot assist, and consented to undergo surgery.    I reviewed the risks of surgery with Liseth Gamez.    These include, but are not limited to, death, myocardial infarction, pneumonia, urinary tract infection, deep venous thrombosis with or without pulmonary embolus, abdominal infection from bowel injury or abscess, bowel obstruction, wound infection, and bleeding.    More specific risks related to laparoscopic cholecystectomy include bile duct injury (3/1000), bile leak (10/1000), retained common bile duct stone (10/1000), postcholecystectomy diarrhea (1-2%) and these complications may require additional treatment.    OPERATIVE DETAILS:      The patient was brought to the operating room and prepared in a routine fashion.  A timeout was performed prior to surgery and documented by the nursing team. Flexed and Reverse Trendelenberg positioning were used.     Under the benefits of general anesthesia, a left upper quadrant Veress needle was inserted  and pneumoperitoneum was established using carbon dioxide gas to a maximum pressure of 15 mmHg.  A total of 4 8mm ports were placed and the endoscope was utilized from the umbilical port. The robot was docked.         Several adhesions to the gallbladder were taken down with hook cautery.     The gallbladder was grasped at its fundus and retracted cephalad.  It was also grasped at its infundibulum and retracted laterally.       Findings related to the gallbladder are as noted above.     A complete dissection starting on the gallbladder, identifying the cystic artery on the surface of the gallbladder, was performed.  The cystic artery in this manner was  away from the gallbladder and the infundibulum of the gallbladder and the junction of gallbladder and cystic duct was clearly and completely identified with blunt dissection.  All of this dissection was performed on the gallbladder wall and clearly above the triangle of Calot.     Next, a complete dissection of the upper aspect of the triangle of Calot was performed and the critical view of safety was achieved.     The cystic artery and cystic duct were clipped securely after anatomic confirmation with Firefly. 2 stay clips were used on the duct and 1 on the artery, and divided between clips. The gallbladder was then removed out of its fossa using electrocautery.  It was placed into an Endocatch bag and removed from the abdomen.          Complete hemostasis was achieved. The robot was undocked.    A TAP block was performed using 25 ml local (quarter percent marcaine) under direct vision.       The skin was closed using 4-0 monocryl suture and skin glue was applied.     I was present for all critical components of the operation and all needle and sponge counts were correct x2 at the end of the procedure.    Haroldo Rust MD  Surgery  [phone] (hospital )  [phone] (clinic nurses)

## 2022-07-14 NOTE — ANESTHESIA PREPROCEDURE EVALUATION
Anesthesia Pre-Procedure Evaluation    Patient: Liseth Gamez   MRN: [id number] : 1985        Procedure : Procedure(s):  CHOLECYSTECTOMY, LAPAROSCOPIC WITH ROBOTIC-ASSIST          Past Medical History:   Diagnosis Date     Anxiety      Hashimoto's thyroiditis      Symptomatic cholelithiasis       Past Surgical History:   Procedure Laterality Date     EGD       RECONSTRUCTION NASAL VALVE, WITH SUBMUCOSAL RESECTION OF TURBINATES AND SEPTOPLASTY        No Known Allergies   Social History     Tobacco Use     Smoking status: Never Smoker     Smokeless tobacco: Never Used   Substance Use Topics     Alcohol use: Not on file      Wt Readings from Last 1 Encounters:   22 59.9 kg (132 lb)        Anesthesia Evaluation            ROS/MED HX  ENT/Pulmonary:  - neg pulmonary ROS     Neurologic:  - neg neurologic ROS     Cardiovascular:  - neg cardiovascular ROS     METS/Exercise Tolerance:     Hematologic:  - neg hematologic  ROS     Musculoskeletal:  - neg musculoskeletal ROS     GI/Hepatic:     (+) cholecystitis/cholelithiasis,     Renal/Genitourinary:       Endo:     (+) thyroid problem, hypothyroidism,     Psychiatric/Substance Use:  - neg psychiatric ROS     Infectious Disease:  - neg infectious disease ROS     Malignancy:  - neg malignancy ROS     Other:  - neg other ROS             OUTSIDE LABS:  CBC: No results found for: WBC, HGB, HCT, PLT  BMP: No results found for: NA, POTASSIUM, CHLORIDE, CO2, BUN, CR, GLC  COAGS: No results found for: PTT, INR, FIBR  POC:   Lab Results   Component Value Date    HCG Negative 2022     HEPATIC: No results found for: ALBUMIN, PROTTOTAL, ALT, AST, GGT, ALKPHOS, BILITOTAL, BILIDIRECT, YEN  OTHER: No results found for: PH, LACT, A1C, TL, PHOS, MAG, LIPASE, AMYLASE, TSH, T4, T3, CRP, SED    Anesthesia Plan    ASA Status:  1      Anesthesia Type: General.     - Airway: ETT              Consents    Anesthesia Plan(s) and associated risks, benefits, and realistic  alternatives discussed. Questions answered and patient/representative(s) expressed understanding.     - Discussed: Risks, Benefits and Alternatives for BOTH SEDATION and the PROCEDURE were discussed     - Discussed with:  Patient      - Extended Intubation/Ventilatory Support Discussed: No.      - Patient is DNR/DNI Status: No    Use of blood products discussed: No .     Postoperative Care    Pain management: IV analgesics, Oral pain medications.   PONV prophylaxis: Ondansetron (or other 5HT-3), Dexamethasone or Solumedrol, Scopolamine patch     Comments:                Noé Le MD, MD

## 2022-07-14 NOTE — DISCHARGE INSTRUCTIONS
[facility]  Home Care Following Anesthesia  For 24 hours after surgery:  Get plenty of rest.  A responsible adult must stay with you for at least 24 hours after you leave the surgery center.  Do not drive or use heavy equipment.  If you have weakness or tingling, don't drive or use heavy equipment until this feeling goes away.   Do not drink alcohol.   Avoid strenuous or risky activities.  Ask for help when climbing stairs.  You may feel lightheaded.  IF so, sit for a few minutes before standing.  Have someone help you get up.   If you have nausea (feel sick to your stomach): Drink only clear liquids such as apple juice, ginger ale, broth or 7-Up.  Rest may also help.  Be sure to drink enough fluids.  Move to a regular diet as you feel able.   You may have a slight fever.  Call the doctor if your fever is over 100 F (37.7 C) (taken under the tongue) or lasts longer than 24 hours.  You may have a dry mouth, a sore throat, muscle aches or trouble sleeping. These should go away after 24 hours.  Do not make important or legal decisions.   It is recommended to avoid smoking.               Tips for taking pain medications  To get the best pain relief possible, remember these points:  Take pain medications as directed, before pain becomes severe.  Pain medication can upset your stomach: taking it with food may help.  Constipation is a common side effect of pain medication. Drink plenty of  fluids.  Eat foods high in fiber. Take a stool softener if recommended by your doctor or pharmacist.  Do not drink alcohol, drive or operate machinery while taking pain medications.  Ask about other ways to control pain, such as with heat, ice or relaxation.    Tylenol/Acetaminophen Consumption  To help encourage the safe use of acetaminophen, the makers of TYLENOL  have lowered the maximum daily dose for single-ingredient Extra Strength TYLENOL  (acetaminophen) products sold in the [place] from 8 pills  per day (4,000 mg) to 6 pills per day (3,000 mg). The dosing interval has also changed from 2 pills every 4-6 hours to 2 pills every 6 hours.  If you feel your pain relief is insufficient, you may take Tylenol/Acetaminophen in addition to your narcotic pain medication.   Be careful not to exceed 3,000 mg of Tylenol/Acetaminophen in a 24 hour period from all sources.  If you are taking extra strength Tylenol/acetaminophen (500 mg), the maximum dose is 6 tablets in 24 hours.  If you are taking regular strength acetaminophen (325 mg), the maximum dose is 9 tablets in 24 hours.  You were given 975mg of Tylenol at 11:30am, you may take Tylenol again at 5:30pm.     Call a doctor for any of the following:  Signs of infection (fever, growing tenderness at the surgery site, a large amount of drainage or bleeding, severe pain, foul-smelling drainage, redness, swelling).  It has been over 8 to 10 hours since surgery and you are still not able to urinate (pass water).  Headache for over 24 hours.  Numbness, tingling or weakness the day after surgery (if you had spinal anesthesia).  Signs of Covid-19 infection (temperature over 100 degrees, shortness of breath, cough, loss of taste/smell, generalized body aches, persistent headache, chills, sore throat, nausea/vomiting/diarrhea)  Your doctor is:       Dr. Haroldo Rust, General Surgery: [phone]               Or dial [phone] and ask for the resident on call for:  General Surgery  For emergency care, call the:  [facility]:  [phone] (TTY for hearing impaired: [phone])

## 2022-07-14 NOTE — ANESTHESIA PROCEDURE NOTES
Airway       Patient location during procedure: OR       Procedure Start/Stop Times: 7/14/2022 12:18 PM  Staff -        CRNA: Dorys Perez APRN CRNA       Performed By: CRNAIndications and Patient Condition       Indications for airway management: vishal-procedural       Induction type:intravenous       Mask difficulty assessment: 1 - vent by mask    Final Airway Details       Final airway type: endotracheal airway       Successful airway: ETT - single  Endotracheal Airway Details        ETT size (mm): 7.0       Cuffed: yes       Successful intubation technique: direct laryngoscopy       DL Blade Type: MAC 3       Grade View of Cords: 1       Adjucts: stylet       Position: Right       Measured from: gums/teeth       Bite block used: None    Post intubation assessment        Placement verified by: capnometry, equal breath sounds and chest rise        Number of attempts at approach: 1       Number of other approaches attempted: 0       Secured with: silk tape       Ease of procedure: easy       Dentition: Intact and Unchanged    Medication(s) Administered   Medication Administration Time: 7/14/2022 12:18 PM

## 2022-07-14 NOTE — ANESTHESIA POSTPROCEDURE EVALUATION
Patient: Liseth Gamez    Procedure: Procedure(s):  CHOLECYSTECTOMY, LAPAROSCOPIC WITH ROBOTIC-ASSIST       Anesthesia Type:  General    Note:  Disposition: Outpatient   Postop Pain Control: Uneventful            Sign Out: Well controlled pain   PONV: No   Neuro/Psych: Uneventful            Sign Out: Acceptable/Baseline neuro status   Airway/Respiratory: Uneventful            Sign Out: Acceptable/Baseline resp. status   CV/Hemodynamics: Uneventful            Sign Out: Acceptable CV status; No obvious hypovolemia; No obvious fluid overload   Other NRE: NONE   DID A NON-ROUTINE EVENT OCCUR? No           Last vitals:  Vitals Value Taken Time   /73 07/14/22 1330   Temp 36.1  C (97  F) 07/14/22 1330   Pulse     Resp 16 07/14/22 1330   SpO2 97 % 07/14/22 1330       Electronically Signed By: Noé Le MD, MD  July 14, 2022  2:00 PM

## 2022-07-17 ENCOUNTER — NURSE TRIAGE (OUTPATIENT)
Dept: NURSING | Facility: CLINIC | Age: 37
End: 2022-07-17

## 2022-07-17 ENCOUNTER — TELEPHONE (OUTPATIENT)
Dept: SURGERY | Facility: CLINIC | Age: 37
End: 2022-07-17

## 2022-07-18 ENCOUNTER — TELEPHONE (OUTPATIENT)
Dept: SURGERY | Facility: CLINIC | Age: 37
End: 2022-07-18

## 2022-07-18 NOTE — TELEPHONE ENCOUNTER
"Patient phone call note  7/17/2022 (Delayed note entry)    38 yo F s/p laparoscopic robotic-assisted cholecystectomy on 7/14 with Dr. Rust.   Calls with concerns of blood in her urine starting today. She reports small \"dried black/brown specks\" about 5mm in size as well as some bright red blood on toilet paper only, not mixed in with urine. She takes OCPs and does not typically menstruate monthly or have any spotting, so this is unusual for her. Denies dysuria, fever, chills. Has been eating and drinking well since surgery. No other issues, feeling well. We discussed that this new blood/clot should be evaluated ideally within the next 24 hours, but I do not think this is something for which she needs to urgently come in to the ED overnight. She is planning to call either the surgery clinic or her PCP tomorrow to discuss her hematuria. If the bright red blood significantly increases, she begins to pass significantly larger clots, she develops dizziness, lightheadedness, fever/chills, she will come in to the ED for evaluation sooner. She feels comfortable with this plan and has no further questions at this time.     Joellen Calvo MD  Surgery PGY-2  "

## 2022-07-18 NOTE — TELEPHONE ENCOUNTER
[facility] Call Center    Phone Message    May a detailed message be left on voicemail: yes     Reason for Call: Symptoms or Concerns     If patient has red-flag symptoms, warm transfer to triage line    Current symptom or concern:  Blood in urine - not fresh blood, dried, brown, clotted blood.     Symptoms have been present for:  1+ day(s)    Has patient previously been seen for this? No    By : NA    Date: NA    Are there any new or worsening symptoms? Yes: New Symptoms.       Action Taken: Message routed to:  [facility] ([facility] General Surgery    Travel Screening: Not Applicable

## 2022-07-18 NOTE — TELEPHONE ENCOUNTER
"Denies painful urination, fever.  States urine is brownish in color with \"blobs\" in it.  States the discolored urine started yesterday.  Denies flank pain or bladder pain.  States urine test before surgery negative for UTI.    Dr. Rust notified of patient condition.  States unrelated to surgery.  Patient to seek care with ED since PCP unable to see patient urgently.  "

## 2022-07-18 NOTE — TELEPHONE ENCOUNTER
Patient calling to report [facility].    Started having urine that looked brown along with brown-colored small dried specks.  Now there was some bright red on toilet paper when wiping after urinating and some spotting on underwear between urinating.  Denies fever, pain, nausea, dizziness, and incision is intact.    Treated for UTI a couple weeks ago and that cleared up.    Disposition for urine symptoms is to see provider within 24 hours, but patient did try to call the surgery resident on call per AVS and protocol but hasn't heard back.  Patient will call again.  Will call back for worsening symptoms.    Janice Bryson RN  [facility]      Reason for Disposition    Blood in urine  (Exception: could be normal menstrual bleeding)    Additional Information    Negative: Sounds like a life-threatening emergency to the triager    Negative: Chest pain    Negative: Difficulty breathing    Negative: Acting confused (e.g., disoriented, slurred speech) or excessively sleepy    Negative: Surgical incision symptoms and questions    Negative: [1] Discomfort (pain, burning or stinging) when passing urine AND [2] male    Negative: [1] Discomfort (pain, burning or stinging) when passing urine AND [2] female    Negative: Constipation    Negative: New or worsening leg (calf, thigh) pain    Negative: New or worsening leg swelling    Negative: Dizziness is severe, or persists > 24 hours after surgery    Negative: Pain, redness, swelling, or pus at IV Site    Negative: Symptoms arising from use of a urinary catheter (Gee or Coude)    Negative: Cast problems or questions    Negative: Medication question    Negative: [1] Widespread rash AND [2] bright red, sunburn-like    Negative: [1] SEVERE headache AND [2] after spinal (epidural) anesthesia    Negative: [1] Vomiting AND [2] persists > 4 hours    Negative: [1] Vomiting AND [2] abdomen looks much more swollen than  usual    Negative: [1] Drinking very little AND [2] dehydration suspected (e.g., no urine > 12 hours, very dry mouth, very lightheaded)    Negative: Patient sounds very sick or weak to the triager    Negative: Sounds like a serious complication to the triager    Negative: Fever > 100.4 F (38.0 C)    Negative: [1] SEVERE post-op pain (e.g., excruciating, pain scale 8-10) AND [2] not controlled with pain medications    Negative: [1] Caller has URGENT question AND [2] triager unable to answer question    Negative: [1] Headache AND [2] after spinal (epidural) anesthesia AND [3] not severe    Negative: Fever present > 3 days (72 hours)    Negative: [1] MILD-MODERATE post-op pain (e.g., pain scale 1-7) AND [2] not controlled with pain medications    Negative: Shock suspected (e.g., cold/pale/clammy skin, too weak to stand, low BP, rapid pulse)    Negative: Sounds like a life-threatening emergency to the triager    Negative: Urinary catheter, questions about    Negative: Recent back or abdominal injury    Negative: Recent genital injury    Negative: [1] Unable to urinate (or only a few drops) > 4 hours AND [2] bladder feels very full (e.g., palpable bladder or strong urge to urinate)    Negative: Passing pure blood or large blood clots (i.e., size > a dime) (Exception: liu or small strands)    Negative: Fever > 100.4 F (38.0 C)    Negative: Patient sounds very sick or weak to the triager    Negative: Known sickle cell disease    Negative: Taking Coumadin (warfarin) or other strong blood thinner, or known bleeding disorder (e.g., thrombocytopenia)    Negative: Side (flank) or back pain present    Negative: Pain or burning with passing urine    Negative: [1] Pink or red-colored urine and likely from food (beets, rhubarb, red food dye) AND [2] lasts > 24 hours after stopping food    Protocols used: URINE - BLOOD IN-A-AH, POST-OP SYMPTOMS AND [id number]

## 2022-07-19 LAB
PATH REPORT.COMMENTS IMP SPEC: NORMAL
PATH REPORT.FINAL DX SPEC: NORMAL
PATH REPORT.GROSS SPEC: NORMAL
PATH REPORT.MICROSCOPIC SPEC OTHER STN: NORMAL
PATH REPORT.RELEVANT HX SPEC: NORMAL
PHOTO IMAGE: NORMAL

## 2022-07-24 ENCOUNTER — TELEPHONE (OUTPATIENT)
Dept: SURGERY | Facility: CLINIC | Age: 37
End: 2022-07-24

## 2022-07-24 ENCOUNTER — HEALTH MAINTENANCE LETTER (OUTPATIENT)
Age: 37
End: 2022-07-24

## 2022-07-25 ENCOUNTER — OFFICE VISIT (OUTPATIENT)
Dept: ENDOCRINOLOGY | Facility: CLINIC | Age: 37
End: 2022-07-25
Payer: COMMERCIAL

## 2022-07-25 ENCOUNTER — NURSE TRIAGE (OUTPATIENT)
Dept: NURSING | Facility: CLINIC | Age: 37
End: 2022-07-25

## 2022-07-25 VITALS
SYSTOLIC BLOOD PRESSURE: 123 MMHG | TEMPERATURE: 98.5 F | DIASTOLIC BLOOD PRESSURE: 88 MMHG | HEART RATE: 94 BPM | OXYGEN SATURATION: 99 %

## 2022-07-25 DIAGNOSIS — L08.9 LOCAL INFECTION OF SKIN AND SUBCUTANEOUS TISSUE: Primary | ICD-10-CM

## 2022-07-25 PROCEDURE — 99024 POSTOP FOLLOW-UP VISIT: CPT | Performed by: PHYSICIAN ASSISTANT

## 2022-07-25 RX ORDER — CEPHALEXIN 500 MG/1
500 CAPSULE ORAL 2 TIMES DAILY
Qty: 10 CAPSULE | Refills: 0 | Status: SHIPPED | OUTPATIENT
Start: 2022-07-25 | End: 2022-07-30

## 2022-07-25 ASSESSMENT — PAIN SCALES - GENERAL: PAINLEVEL: MILD PAIN (3)

## 2022-07-25 NOTE — TELEPHONE ENCOUNTER
Nurse Triage SBAR     Is this a 2nd Level Triage?   Yes    Situation:   Redness, warmth, stinging, raised and puffy incision    Background/Assessment:     On 7/14/2022 Pt had CHOLECYSTECTOMY, LAPAROSCOPIC WITH ROBOTIC-ASSIST    Pt reporting over the weekend the incision on the right side.   Started to appear red, warm, inflamed, puffy, raised and stinging on Saturday.    Pt called the On Call Provider.     At that point the Pt was told to call back if there were worsen symptoms today.   This morning the right incision is more red, painful, warm, puffy, raised and there is yellowish/green drainage under the clear bandage.       Also the incision on the left side is starting to turn more red and painful.      No fever, hot sweats, cold sweats or the chills.   But wondering if the incisions are infected and needing some kind of medication sent to the [facility] pharmacy at [address] [place] [address] in [place], [place].    Pt sent pictures through MY Chart for the Provider to look at.     Please call the Pt back @ [phone].     Suzan Clements RN  Central Triage Red Flags/Med Refills    Protocol Recommended Disposition:   See Today Or Tomorrow In Office      Reason for Disposition    Patient wants to be seen    Additional Information    Negative: Major abdominal surgical incision and wound gaping open with visible internal organs    Negative: Sounds like a life-threatening emergency to the triager    Negative: Bleeding from incision and won't stop after 10 minutes of direct pressure    Negative: Widespread rash and bright red, sunburn-like    Negative: Severe pain in the incision    Negative: Incision gaping open and < 2 days (48 hours) since wound re-opened    Negative: Incision gaping open and length of opening > 2 inches (5 cm)    Negative: Patient sounds very sick or weak to the triager    Negative: Sounds like a serious complication to the triager    Negative: Fever > 100.4 F (38.0 C)    Negative: Incision looks infected (spreading  redness, pain)    Negative: Red streak runs from the incision and longer than 1 inch (2.5 cm)    Negative: Pus or bad-smelling fluid draining from incision    Negative: Dressing soaked with blood or body fluid (e.g., drainage)    Negative: Raised bruise and size > 2 inches (5 cm) and expanding    Negative: Caller has URGENT question and triager unable to answer question    Negative: Incision gaping open and length of opening > 1/4 inch (6 mm) and on the face and over 2 days since wound re-opened    Negative: Incision gaping open and length of opening > 1/2 inch (1 cm) and over 2 days since wound re-opened    Negative: Clear or blood-tinged fluid draining from wound and no fever    Negative: Suture or staple removal is overdue    Protocols used: POST-OP INCISION SYMPTOMS AND [id number]

## 2022-07-25 NOTE — TELEPHONE ENCOUNTER
Patient Telephone Call  07/24/22    Patient calls with concern about increased redness around one of her port site incisions, first noticed yesterday. Sent photos via [employer], included below.       No drainage. Denies fever, chills. Does feel tender when she presses the incision directly. Based on the appearance of the incision in the photos she sent, can likely watch and wait for this to improve as it heals. Advised her that it is okay to gently wash with mild soap and water, do not scrub and do not peel off the skin glue, it will fall off on its own. She will call the surgery clinic in the morning so they can review the photos as well. If she develops fever/chills or notices significant spreading of the area of redness around the incision, she will either call back come in to the ED.     Joellen Calvo MD  Surgery PGY-2

## 2022-07-25 NOTE — LETTER
Date:July 25, 2022      Provider requested that no letter be sent. Do not send.       [facility]

## 2022-07-25 NOTE — PROGRESS NOTES
Patient underwent prior lap harlan surgery and this occurred 11 days ago.    Liseth Gamez overall has the following complaints: burning pain, redness and swelling right lateral lap site.    On exam:  /88   Pulse 94   Temp 98.5  F (36.9  C) (Oral)   LMP 06/24/2022 (Within Days)   SpO2 99%   Lung exam: breathing unlabored  Abdominal exam: soft; nontender; nondistended; incisions: right lateral incision with mild erythema surrounding lap site, scabbed over mostly, skin glue still in place, small amount of purulent drainage expressed with palpation.  Incision not open and no areas of swelling or deeper pockets of pus palpated. Other lap sites healing well and skin glue still in place.     Plan:  Keflex 500mg twice daily x 5 days  Follow up Friday with Dr Rust prn  Keep clean and dry, ok to shower and wash with soap and water, pat dry      Ninfa Brooks PA-C [facility]  [facility]     [phone]  [email or web address]

## 2022-07-25 NOTE — LETTER
7/25/2022       RE: Liseth Gamez  [address]  [place] [address]     Dear Colleague,    Thank you for referring your patient, Liseth Gamez, to the [facility] [place] at [facility]. Please see a copy of my visit note below.    Patient underwent prior lap harlan surgery and this occurred 11 days ago.    Liseth Gamez overall has the following complaints: burning pain, redness and swelling right lateral lap site.    On exam:  /88   Pulse 94   Temp 98.5  F (36.9  C) (Oral)   LMP 06/24/2022 (Within Days)   SpO2 99%   Lung exam: breathing unlabored  Abdominal exam: soft; nontender; nondistended; incisions: right lateral incision with mild erythema surrounding lap site, scabbed over mostly, skin glue still in place, small amount of purulent drainage expressed with palpation.  Incision not open and no areas of swelling or deeper pockets of pus palpated. Other lap sites healing well and skin glue still in place.     Plan:  Keflex 500mg twice daily x 5 days  Follow up Friday with Dr Rust prn  Keep clean and dry, ok to shower and wash with soap and water, pat dry      Ninfa Brooks PA-C MP[facility]  [facility]     [phone]  [email or web address]            Again, thank you for allowing me to participate in the care of your patient.      Sincerely,    Ninfa Brooks PA-C

## 2022-07-29 ENCOUNTER — OFFICE VISIT (OUTPATIENT)
Dept: SURGERY | Facility: CLINIC | Age: 37
End: 2022-07-29
Payer: COMMERCIAL

## 2022-07-29 VITALS
HEIGHT: 66 IN | SYSTOLIC BLOOD PRESSURE: 118 MMHG | WEIGHT: 135 LBS | OXYGEN SATURATION: 99 % | DIASTOLIC BLOOD PRESSURE: 72 MMHG | BODY MASS INDEX: 21.69 KG/M2 | HEART RATE: 92 BPM

## 2022-07-29 DIAGNOSIS — K80.20 SYMPTOMATIC CHOLELITHIASIS: Primary | ICD-10-CM

## 2022-07-29 PROCEDURE — 99024 POSTOP FOLLOW-UP VISIT: CPT | Performed by: SURGERY

## 2022-07-29 ASSESSMENT — PAIN SCALES - GENERAL: PAINLEVEL: MILD PAIN (2)

## 2022-07-29 NOTE — NURSING NOTE
"Chief Complaint   Patient presents with     RECHECK     Incision pain       Vitals:    07/29/22 0945   BP: 118/72   BP Location: Left arm   Patient Position: Sitting   Cuff Size: Adult Regular   Pulse: 92   SpO2: 99%   Weight: 61.2 kg (135 lb)   Height: 1.676 m (5' 6\")       Body mass index is 21.79 kg/m .                          Keren Hough, EMT    "

## 2022-07-29 NOTE — LETTER
"7/29/2022       RE: Liseth Gamez  [address] [place] [address]     Dear Colleague,    Thank you for referring your patient, Liseth Gamez, to the [facility] [place] at [facility]. Please see a copy of my visit note below.    Patient underwent prior lap harlan with robot assist surgery and this occurred 2 weeks ago.    Liseth Gamez overall has the following complaints: Had wound probed on R side, given Keflex    Wounds was slightly open, probed without expression of pus. No malodor.     On exam:  /72 (BP Location: Left arm, Patient Position: Sitting, Cuff Size: Adult Regular)   Pulse 92   Ht 1.676 m (5' 6\")   Wt 61.2 kg (135 lb)   LMP 06/24/2022 (Within Days)   SpO2 99%   BMI 21.79 kg/m    Lung exam: breathing unlabored  Abdominal exam: soft; nontender; nondistended; incisions c/d/i, R sided incision slightl open    Plan:  -cleaned R incision, added Steris  -Provided wound care supplies if needed  -RTC prn  -Path reviewed        Haroldo Rust MD    Surgery  [phone] (hospital )  [phone] (clinic nurses)                    Again, thank you for allowing me to participate in the care of your patient.      Sincerely,    Haroldo Rust MD      "

## 2022-07-29 NOTE — LETTER
Date:July 29, 2022      Provider requested that no letter be sent. Do not send.       [facility]

## 2022-07-29 NOTE — PROGRESS NOTES
"Patient underwent prior lap harlan with robot assist surgery and this occurred 2 weeks ago.    Liseth Gamez overall has the following complaints: Had wound probed on R side, given Keflex    Wounds was slightly open, probed without expression of pus. No malodor.     On exam:  /72 (BP Location: Left arm, Patient Position: Sitting, Cuff Size: Adult Regular)   Pulse 92   Ht 1.676 m (5' 6\")   Wt 61.2 kg (135 lb)   LMP 06/24/2022 (Within Days)   SpO2 99%   BMI 21.79 kg/m    Lung exam: breathing unlabored  Abdominal exam: soft; nontender; nondistended; incisions c/d/i, R sided incision slightl open    Plan:  -cleaned R incision, added Steris  -Provided wound care supplies if needed  -RTC prn  -Path reviewed        Haroldo Rust MD    Surgery  [phone] (hospital )  [phone] (clinic nurses)                "

## 2022-10-03 ENCOUNTER — HEALTH MAINTENANCE LETTER (OUTPATIENT)
Age: 37
End: 2022-10-03

## 2023-08-13 ENCOUNTER — HEALTH MAINTENANCE LETTER (OUTPATIENT)
Age: 38
End: 2023-08-13

## 2024-10-06 ENCOUNTER — HEALTH MAINTENANCE LETTER (OUTPATIENT)
Age: 39
End: 2024-10-06

## (undated) DEVICE — DAVINCI XI CAUTERY HOOK 470183

## (undated) DEVICE — ESU GROUND PAD ADULT W/CORD E7507

## (undated) DEVICE — SPECIMEN CONTAINER W/10% BUFFERED FORMALIN 120ML 591201

## (undated) DEVICE — DAVINCI XI GRASPER ENDOWRIST PROGRASP 470093

## (undated) DEVICE — DAVINCI XI SEAL UNIVERSAL 5-8MM 470361

## (undated) DEVICE — TAPE MEDIPORE 4"X2YD 2864

## (undated) DEVICE — BLADE CLIPPER SGL USE 9680

## (undated) DEVICE — PACK LAP CHOLE CUSTOM ASC

## (undated) DEVICE — SOL WATER IRRIG 500ML BOTTLE 2F7113

## (undated) DEVICE — CLIP ENDO HEMO-LOC PURPLE LG 544240

## (undated) DEVICE — SYR 30ML SLIP TIP W/O NDL 302833

## (undated) DEVICE — DRSG STERI STRIP 1/2X4" R1547

## (undated) DEVICE — DAVINCI XI DRAPE ARM 470015

## (undated) DEVICE — GLOVE PROTEXIS BLUE W/NEU-THERA 7.5  2D73EB75

## (undated) DEVICE — DAVINCI XI DRAPE COLUMN 470341

## (undated) DEVICE — DAVINCI XI CLIP APPLIER LG HEM-O-CLIP 8MM 470230

## (undated) DEVICE — SU MONOCRYL 4-0 PS-2 27" UND Y426H

## (undated) DEVICE — LINEN TOWEL PACK X5 5464

## (undated) DEVICE — DAVINCI XI FCP BIPOLAR FENESTRATED 470205

## (undated) DEVICE — GLOVE PROTEXIS POWDER FREE SMT 7.5  2D72PT75X

## (undated) DEVICE — GOWN XLG DISP 9545

## (undated) DEVICE — DRAPE MAYO STAND 23X54 8337

## (undated) DEVICE — PREP CHLORAPREP 26ML TINTED ORANGE  260815

## (undated) DEVICE — DRSG GAUZE 4X4" 3033

## (undated) DEVICE — SU VICRYL 0 CT-2 27" J334H

## (undated) RX ORDER — HYDROMORPHONE HYDROCHLORIDE 1 MG/ML
INJECTION, SOLUTION INTRAMUSCULAR; INTRAVENOUS; SUBCUTANEOUS
Status: DISPENSED
Start: 2022-07-14

## (undated) RX ORDER — ONDANSETRON 2 MG/ML
INJECTION INTRAMUSCULAR; INTRAVENOUS
Status: DISPENSED
Start: 2022-07-14

## (undated) RX ORDER — KETOROLAC TROMETHAMINE 30 MG/ML
INJECTION, SOLUTION INTRAMUSCULAR; INTRAVENOUS
Status: DISPENSED
Start: 2022-07-14

## (undated) RX ORDER — GABAPENTIN 300 MG/1
CAPSULE ORAL
Status: DISPENSED
Start: 2022-07-14

## (undated) RX ORDER — OXYCODONE HYDROCHLORIDE 5 MG/1
TABLET ORAL
Status: DISPENSED
Start: 2022-07-14

## (undated) RX ORDER — ACETAMINOPHEN 325 MG/1
TABLET ORAL
Status: DISPENSED
Start: 2022-07-14

## (undated) RX ORDER — INDOCYANINE GREEN AND WATER 25 MG
KIT INJECTION
Status: DISPENSED
Start: 2022-07-14

## (undated) RX ORDER — DEXAMETHASONE SODIUM PHOSPHATE 4 MG/ML
INJECTION, SOLUTION INTRA-ARTICULAR; INTRALESIONAL; INTRAMUSCULAR; INTRAVENOUS; SOFT TISSUE
Status: DISPENSED
Start: 2022-07-14

## (undated) RX ORDER — SCOLOPAMINE TRANSDERMAL SYSTEM 1 MG/1
PATCH, EXTENDED RELEASE TRANSDERMAL
Status: DISPENSED
Start: 2022-07-14

## (undated) RX ORDER — FENTANYL CITRATE 50 UG/ML
INJECTION, SOLUTION INTRAMUSCULAR; INTRAVENOUS
Status: DISPENSED
Start: 2022-07-14